# Patient Record
Sex: FEMALE | Race: WHITE
[De-identification: names, ages, dates, MRNs, and addresses within clinical notes are randomized per-mention and may not be internally consistent; named-entity substitution may affect disease eponyms.]

---

## 2019-08-19 ENCOUNTER — HOSPITAL ENCOUNTER (INPATIENT)
Dept: HOSPITAL 12 - ER | Age: 64
LOS: 1 days | Discharge: HOME | DRG: 176 | End: 2019-08-20
Payer: COMMERCIAL

## 2019-08-19 VITALS — BODY MASS INDEX: 20.37 KG/M2 | HEIGHT: 70 IN | WEIGHT: 142.31 LBS

## 2019-08-19 VITALS — SYSTOLIC BLOOD PRESSURE: 131 MMHG | DIASTOLIC BLOOD PRESSURE: 53 MMHG

## 2019-08-19 DIAGNOSIS — Z92.21: ICD-10-CM

## 2019-08-19 DIAGNOSIS — I45.10: ICD-10-CM

## 2019-08-19 DIAGNOSIS — J98.4: ICD-10-CM

## 2019-08-19 DIAGNOSIS — E03.9: ICD-10-CM

## 2019-08-19 DIAGNOSIS — Z85.048: ICD-10-CM

## 2019-08-19 DIAGNOSIS — Z92.3: ICD-10-CM

## 2019-08-19 DIAGNOSIS — I26.99: Primary | ICD-10-CM

## 2019-08-19 DIAGNOSIS — I51.9: ICD-10-CM

## 2019-08-19 LAB
ALP SERPL-CCNC: 63 U/L (ref 50–136)
ALT SERPL W/O P-5'-P-CCNC: 17 U/L (ref 14–59)
AST SERPL-CCNC: 18 U/L (ref 15–37)
BASOPHILS # BLD AUTO: 0 K/UL (ref 0–8)
BASOPHILS NFR BLD AUTO: 0.6 % (ref 0–2)
BILIRUB DIRECT SERPL-MCNC: 0.1 MG/DL (ref 0–0.2)
BILIRUB SERPL-MCNC: 0.4 MG/DL (ref 0.2–1)
BUN SERPL-MCNC: 14 MG/DL (ref 7–18)
CHLORIDE SERPL-SCNC: 105 MMOL/L (ref 98–107)
CO2 SERPL-SCNC: 27 MMOL/L (ref 21–32)
CREAT SERPL-MCNC: 0.7 MG/DL (ref 0.6–1.3)
EOSINOPHIL # BLD AUTO: 0 K/UL (ref 0–0.7)
EOSINOPHIL NFR BLD AUTO: 0.8 % (ref 0–7)
GLUCOSE SERPL-MCNC: 94 MG/DL (ref 74–106)
HCT VFR BLD AUTO: 38.5 % (ref 31.2–41.9)
HGB BLD-MCNC: 13.5 G/DL (ref 10.9–14.3)
LYMPHOCYTES # BLD AUTO: 1.7 K/UL (ref 20–40)
LYMPHOCYTES NFR BLD AUTO: 34.1 % (ref 20.5–51.5)
MCH RBC QN AUTO: 33.7 UUG (ref 24.7–32.8)
MCHC RBC AUTO-ENTMCNC: 35 G/DL (ref 32.3–35.6)
MCV RBC AUTO: 96.7 FL (ref 75.5–95.3)
MONOCYTES # BLD AUTO: 0.4 K/UL (ref 2–10)
MONOCYTES NFR BLD AUTO: 7.7 % (ref 0–11)
NEUTROPHILS # BLD AUTO: 2.9 K/UL (ref 1.8–8.9)
NEUTROPHILS NFR BLD AUTO: 56.8 % (ref 38.5–71.5)
PLATELET # BLD AUTO: 176 K/UL (ref 179–408)
POTASSIUM SERPL-SCNC: 3.9 MMOL/L (ref 3.5–5.1)
RBC # BLD AUTO: 3.99 MIL/UL (ref 3.63–4.92)
WBC # BLD AUTO: 5 K/UL (ref 3.8–11.8)
WS STN SPEC: 7.2 G/DL (ref 6.4–8.2)

## 2019-08-19 PROCEDURE — G0378 HOSPITAL OBSERVATION PER HR: HCPCS

## 2019-08-19 NOTE — NUR
-------------------------------------------------------------------------------

           *** Note undone in [facility] - 08/19/19 at 1828 by VICENTE ***            

-------------------------------------------------------------------------------

HR,66, 96% sat, 96/29.

## 2019-08-19 NOTE — NUR
-------------------------------------------------------------------------------

           *** Note undone in EDM - 08/19/19 at 1825 by VICENTE ***            

-------------------------------------------------------------------------------

Dr. Riley at bedside and spoke with patient orders not to give 2nd dose of NS 
received. Patient want to be dcd.

## 2019-08-19 NOTE — NUR
RECEIVED PATIENT FROM ED BY RON 63 YEARS OLD FEMALE WITH DX OF CHEST PAIN ASSISTED INTO 
BED FIXED AND MADE COMFORTABLE PATIENT IS ALERT AND ORIENTED DENIES CHEST PAIN AT THIS TIME 
ON ROOM AIR WITH NO SHORTNESS OF BREATH AT THIS TIME.TELEMETRY APPLIED AS ORDERED AND SHE IS 
SINUS CASH AT 56 DENIES PAIN OR DISCOMFORTS AT THIS TIME MADE COMFORTABLE WILL ENDORSE THE 
ADMISSION TO THE NEXT SHIFT.

## 2019-08-19 NOTE — NUR
RECEIVED PT AWAKE, ALERT AND ORIENTEDX4.  AT BEDSIDE. AWAITING ADMITTING ORDERS. PT 
SHOWS NO SIGNS OF ACUTE DISTRESS. IV INTACT. SAFETY AND COMFORT PROVIDED. WILL CONTINUE TO 
MONITOR.

## 2019-08-19 NOTE — NUR
Patient taken up to room 305 via gurney [id number] vitals stable. On Ra with 
saturtion above desire limits. RAC G 20 patent.

## 2019-08-19 NOTE — NUR
-------------------------------------------------------------------------------

           *** Note undone in [facility] - 08/19/19 at 1825 by VICENTE ***            

-------------------------------------------------------------------------------

Call for report awaiting call back nurses informed that patient still 
undergoing venous duplex at this time.

## 2019-08-19 NOTE — NUR
-------------------------------------------------------------------------------

           *** Note ronni in EDM - 08/19/19 at 1823 by VICENTE ***            

-------------------------------------------------------------------------------

1st bag of saline still running and at this time pt. stated that spoke with 
 and now wants all treatments and procedures to be cancelled, patient 
verbalized been concern that insurance will not covered all the cost of care.

## 2019-08-20 VITALS — DIASTOLIC BLOOD PRESSURE: 48 MMHG | SYSTOLIC BLOOD PRESSURE: 105 MMHG

## 2019-08-20 VITALS — SYSTOLIC BLOOD PRESSURE: 122 MMHG | DIASTOLIC BLOOD PRESSURE: 57 MMHG

## 2019-08-20 VITALS — SYSTOLIC BLOOD PRESSURE: 110 MMHG | DIASTOLIC BLOOD PRESSURE: 48 MMHG

## 2019-08-20 LAB
BASOPHILS # BLD AUTO: 0 K/UL (ref 0–8)
BASOPHILS NFR BLD AUTO: 0.9 % (ref 0–2)
BUN SERPL-MCNC: 15 MG/DL (ref 7–18)
CHLORIDE SERPL-SCNC: 107 MMOL/L (ref 98–107)
CHOLEST SERPL-MCNC: 163 MG/DL (ref ?–200)
CO2 SERPL-SCNC: 27 MMOL/L (ref 21–32)
CREAT SERPL-MCNC: 0.7 MG/DL (ref 0.6–1.3)
EOSINOPHIL # BLD AUTO: 0 K/UL (ref 0–0.7)
EOSINOPHIL NFR BLD AUTO: 1.2 % (ref 0–7)
GLUCOSE SERPL-MCNC: 92 MG/DL (ref 74–106)
HCT VFR BLD AUTO: 36 % (ref 31.2–41.9)
HDLC SERPL-MCNC: 58 MG/DL (ref 40–60)
HGB BLD-MCNC: 12.6 G/DL (ref 10.9–14.3)
LYMPHOCYTES # BLD AUTO: 1.6 K/UL (ref 20–40)
LYMPHOCYTES NFR BLD AUTO: 40.6 % (ref 20.5–51.5)
MAGNESIUM SERPL-MCNC: 2 MG/DL (ref 1.8–2.4)
MCH RBC QN AUTO: 34 UUG (ref 24.7–32.8)
MCHC RBC AUTO-ENTMCNC: 35 G/DL (ref 32.3–35.6)
MCV RBC AUTO: 96.8 FL (ref 75.5–95.3)
MONOCYTES # BLD AUTO: 0.3 K/UL (ref 2–10)
MONOCYTES NFR BLD AUTO: 7.2 % (ref 0–11)
NEUTROPHILS # BLD AUTO: 2 K/UL (ref 1.8–8.9)
NEUTROPHILS NFR BLD AUTO: 50.1 % (ref 38.5–71.5)
PHOSPHATE SERPL-MCNC: 4.6 MG/DL (ref 2.5–4.9)
PLATELET # BLD AUTO: 162 K/UL (ref 179–408)
POTASSIUM SERPL-SCNC: 4 MMOL/L (ref 3.5–5.1)
RBC # BLD AUTO: 3.72 MIL/UL (ref 3.63–4.92)
TRIGL SERPL-MCNC: 84 MG/DL (ref 30–150)
TSH SERPL DL<=0.005 MIU/L-ACNC: 7.9 MIU/ML (ref 0.36–3.74)
WBC # BLD AUTO: 3.9 K/UL (ref 3.8–11.8)

## 2019-08-20 RX ADMIN — ACETAMINOPHEN PRN MG: 325 TABLET ORAL at 12:07

## 2019-08-20 RX ADMIN — ACETAMINOPHEN PRN MG: 325 TABLET ORAL at 06:02

## 2019-08-20 NOTE — NUR
PT SLEPT INTERMITTENTLY. PT SHOWS NO SIGNS OF ACUTE DISTRESS. PT GIVEN TYLENOL FOR HEADACHE. 
PT IV INTACT. SAFETY AND COMFORT PROVIDED. ALL NEEDS ARE MET. WILL ENDORSE ACCORDINGLY TO 
INCOMING NURSE FOR CONTINUITY OF CARE.

## 2019-08-20 NOTE — NUR
DR CASSIE ESQUIVEL HERE AND SEEN PATIENT WITH ORDER TO DISCHARGE PATIENT HOME TODAY PATIENT AWARE 
AND STATED THAT HER  WILL BE HERE IN A LITTLE BIT TO PICK HER UP.

## 2019-08-20 NOTE — NUR
ARLEN REMOVED PATIENT DISCHARGED PICKED UP BY HER  IN SATISFACTORY CONDITION WITH 
DISCHARGE INSTRUCTIONS AND PRESCRIPTIONS AND TO FOLLOW UP WITH HER CARDIOLOGIST WITHIN THE 
NEXT ONE TO TWO WEEKS AND SHE EXPRESSED UNDERSTANDING.

## 2019-08-20 NOTE — NUR
PATIENT IS AWAKE ALERT AND ORIENTED DENIES PAIN OR DISCOMFORTS AT THIS TIME REMAIN ON TELE 
MONITORING SINUS CASH AT THIS TIME.UP AND AMBULATORY DENIES C/O PERSONAL BELONGINGS AND 
CALL LIGHTS ARE WITHIN EASY REACH AT THIS TIME WILL CONTINUE TO OBSERVE.
